# Patient Record
Sex: MALE | Race: BLACK OR AFRICAN AMERICAN
[De-identification: names, ages, dates, MRNs, and addresses within clinical notes are randomized per-mention and may not be internally consistent; named-entity substitution may affect disease eponyms.]

---

## 2020-07-08 ENCOUNTER — HOSPITAL ENCOUNTER (INPATIENT)
Dept: HOSPITAL 12 - ER | Age: 70
LOS: 7 days | Discharge: NURSING, REHAB OR LONG-TERM CARE | DRG: 885 | End: 2020-07-15
Payer: MEDICARE

## 2020-07-08 VITALS — DIASTOLIC BLOOD PRESSURE: 68 MMHG | SYSTOLIC BLOOD PRESSURE: 129 MMHG

## 2020-07-08 VITALS — BODY MASS INDEX: 30.66 KG/M2 | HEIGHT: 71 IN | WEIGHT: 219 LBS

## 2020-07-08 DIAGNOSIS — E44.1: ICD-10-CM

## 2020-07-08 DIAGNOSIS — F17.210: ICD-10-CM

## 2020-07-08 DIAGNOSIS — F20.9: Primary | ICD-10-CM

## 2020-07-08 DIAGNOSIS — F29: ICD-10-CM

## 2020-07-08 DIAGNOSIS — E88.09: ICD-10-CM

## 2020-07-08 DIAGNOSIS — Z59.0: ICD-10-CM

## 2020-07-08 LAB
ALP SERPL-CCNC: 85 U/L (ref 50–136)
ALT SERPL W/O P-5'-P-CCNC: 42 U/L (ref 16–63)
AMPHETAMINES UR QL SCN>1000 NG/ML: NEGATIVE
APAP SERPL-MCNC: < 2 UG/ML (ref 10–30)
APPEARANCE UR: CLEAR
AST SERPL-CCNC: 31 U/L (ref 15–37)
BARBITURATES UR QL SCN: NEGATIVE
BASOPHILS # BLD AUTO: 0.1 K/UL (ref 0–8)
BASOPHILS NFR BLD AUTO: 1.1 % (ref 0–2)
BILIRUB DIRECT SERPL-MCNC: 0.2 MG/DL (ref 0–0.2)
BILIRUB SERPL-MCNC: 0.5 MG/DL (ref 0.2–1)
BILIRUB UR QL STRIP: NEGATIVE
BUN SERPL-MCNC: 16 MG/DL (ref 7–18)
CHLORIDE SERPL-SCNC: 107 MMOL/L (ref 98–107)
CO2 SERPL-SCNC: 29 MMOL/L (ref 21–32)
COCAINE UR QL SCN: NEGATIVE
COLOR UR: YELLOW
CREAT SERPL-MCNC: 0.9 MG/DL (ref 0.6–1.3)
EOSINOPHIL # BLD AUTO: 0.2 K/UL (ref 0–0.7)
EOSINOPHIL NFR BLD AUTO: 2.6 % (ref 0–7)
ETHANOL SERPL-MCNC: < 3 MG/DL (ref 0–0)
GLUCOSE SERPL-MCNC: 94 MG/DL (ref 74–106)
GLUCOSE UR STRIP-MCNC: NEGATIVE MG/DL
HCT VFR BLD AUTO: 44 % (ref 36.7–47.1)
HGB BLD-MCNC: 14.3 G/DL (ref 12.5–16.3)
HGB UR QL STRIP: (no result)
KETONES UR STRIP-MCNC: NEGATIVE MG/DL
LEUKOCYTE ESTERASE UR QL STRIP: NEGATIVE
LYMPHOCYTES # BLD AUTO: 1.7 K/UL (ref 20–40)
LYMPHOCYTES NFR BLD AUTO: 27.5 % (ref 20.5–51.5)
MCH RBC QN AUTO: 32.9 UUG (ref 23.8–33.4)
MCHC RBC AUTO-ENTMCNC: 33 G/DL (ref 32.5–36.3)
MCV RBC AUTO: 101.3 FL (ref 73–96.2)
MONOCYTES # BLD AUTO: 0.4 K/UL (ref 2–10)
MONOCYTES NFR BLD AUTO: 6.6 % (ref 0–11)
NEUTROPHILS # BLD AUTO: 3.9 K/UL (ref 1.8–8.9)
NEUTROPHILS NFR BLD AUTO: 62.2 % (ref 38.5–71.5)
NITRITE UR QL STRIP: NEGATIVE
OPIATES UR QL SCN: NEGATIVE
PCP UR QL SCN>25 NG/ML: NEGATIVE
PH UR STRIP: 6.5 [PH] (ref 5–8)
PLATELET # BLD AUTO: 195 K/UL (ref 152–348)
POTASSIUM SERPL-SCNC: 4.8 MMOL/L (ref 3.5–5.1)
RBC # BLD AUTO: 4.34 MIL/UL (ref 4.06–5.63)
RBC #/AREA URNS HPF: (no result) /HPF (ref 0–3)
SP GR UR STRIP: >=1.03 (ref 1–1.03)
THC UR QL SCN>50 NG/ML: NEGATIVE
UROBILINOGEN UR STRIP-MCNC: 0.2 E.U./DL
WBC # BLD AUTO: 6.3 K/UL (ref 3.6–10.2)
WBC #/AREA URNS HPF: (no result) /HPF (ref 0–3)
WS STN SPEC: 6.6 G/DL (ref 6.4–8.2)

## 2020-07-08 PROCEDURE — A4663 DIALYSIS BLOOD PRESSURE CUFF: HCPCS

## 2020-07-08 PROCEDURE — G0480 DRUG TEST DEF 1-7 CLASSES: HCPCS

## 2020-07-08 SDOH — ECONOMIC STABILITY - HOUSING INSECURITY: HOMELESSNESS: Z59.0

## 2020-07-08 NOTE — NUR
Patient BIB pvt ambulance via [facility]. Patient on 5150 hold for DTS / DTO. 
Denies any HI / SI plans at this time. Breathing even and unlabored. no cough 
or SOB noted. Patient able to ambulate from w/c to bathroom. Safety precautions 
implemented. s/r up x2. No 1:1 sitter available. Supervisor aware. frequent 
visual checks done. NAD noted

## 2020-07-08 NOTE — NUR
1745 Admitte to MHU a 69 yr. old   , placed o 5150 for DTS and DTO. Patient 
hearing voices and suicidal ideation but no plan. Upon face to face patient denies suicidal 
ideation. Patient alert and ox4.  Charge nurse notified p-sychiatric on call and [facility] doctor 
to reconcile medication.

## 2020-07-09 VITALS — DIASTOLIC BLOOD PRESSURE: 88 MMHG | SYSTOLIC BLOOD PRESSURE: 153 MMHG

## 2020-07-09 VITALS — SYSTOLIC BLOOD PRESSURE: 135 MMHG | DIASTOLIC BLOOD PRESSURE: 79 MMHG

## 2020-07-09 LAB
ALP SERPL-CCNC: 83 U/L (ref 50–136)
ALT SERPL W/O P-5'-P-CCNC: 44 U/L (ref 16–63)
AST SERPL-CCNC: 26 U/L (ref 15–37)
BILIRUB SERPL-MCNC: 0.5 MG/DL (ref 0.2–1)
BUN SERPL-MCNC: 17 MG/DL (ref 7–18)
CHLORIDE SERPL-SCNC: 105 MMOL/L (ref 98–107)
CHOLEST SERPL-MCNC: 196 MG/DL (ref ?–200)
CO2 SERPL-SCNC: 29 MMOL/L (ref 21–32)
CREAT SERPL-MCNC: 1 MG/DL (ref 0.6–1.3)
GLUCOSE SERPL-MCNC: 85 MG/DL (ref 74–106)
HDLC SERPL-MCNC: 60 MG/DL (ref 40–60)
POTASSIUM SERPL-SCNC: 4.7 MMOL/L (ref 3.5–5.1)
TRIGL SERPL-MCNC: 132 MG/DL (ref 30–150)
WS STN SPEC: 6.5 G/DL (ref 6.4–8.2)

## 2020-07-09 RX ADMIN — CLONAZEPAM PRN MG: 0.5 TABLET ORAL at 04:21

## 2020-07-09 RX ADMIN — HYDROCODONE BITARTRATE AND ACETAMINOPHEN PRN TAB: 5; 325 TABLET ORAL at 15:07

## 2020-07-09 RX ADMIN — NICOTINE SCH MG: 21 PATCH, EXTENDED RELEASE TOPICAL at 09:45

## 2020-07-09 RX ADMIN — ARIPIPRAZOLE SCH MG: 2 TABLET ORAL at 21:28

## 2020-07-09 NOTE — NUR
Patient came out from his room asking to checked his belongings for staff is stealing some 
of his property. Assisted patient to look into his things. Patient became hyperverbal  and 
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 Patient  
continue to be hypervebal asking for patient's grievance form and it was given. Patient 
stated " I don't want to have a generic medication.Dr. Dorsey talking to patient through 
tele consultation and patient yelling to MD  and continous threatening staff to report to 
administration. Dr. Dorsey order chemical restraint: Benadryl 25 mg im; haldol 5 mg im and 
ativan 2 mg im in three different syringes- administered @ 1513. Will continue to monitor 
behavior.

## 2020-07-09 NOTE — NUR
Substance Abuse Intervention: SW conducted a substance abuse intervention with the pt 
regarding his previous cocaine use and his current cannabis use.

## 2020-07-09 NOTE — NUR
Patient is wandering in the hallway, demanding to see the doctor, asking for his BP meds, 
asking for his knee brace, hyperverbal, difficult to redirect. Given snacks and juice, given 
Klonopin.

## 2020-07-09 NOTE — NUR
Initial Discharge Plan: Pt is currently homeless and states that he would like to be 
discharged to a hospital in [place]. SW will work with the pt and the MD regarding 
appropriate discharge planning. SW will form a safe and proper discharge.

## 2020-07-10 VITALS — SYSTOLIC BLOOD PRESSURE: 124 MMHG | DIASTOLIC BLOOD PRESSURE: 64 MMHG

## 2020-07-10 VITALS — DIASTOLIC BLOOD PRESSURE: 91 MMHG | SYSTOLIC BLOOD PRESSURE: 162 MMHG

## 2020-07-10 RX ADMIN — HYDROCODONE BITARTRATE AND ACETAMINOPHEN PRN TAB: 5; 325 TABLET ORAL at 20:43

## 2020-07-10 RX ADMIN — CLONAZEPAM PRN MG: 0.5 TABLET ORAL at 08:41

## 2020-07-10 RX ADMIN — ARIPIPRAZOLE SCH MG: 2 TABLET ORAL at 20:44

## 2020-07-10 RX ADMIN — CLONIDINE HYDROCHLORIDE PRN MG: 0.1 TABLET ORAL at 08:42

## 2020-07-10 RX ADMIN — NICOTINE SCH MG: 21 PATCH, EXTENDED RELEASE TOPICAL at 09:00

## 2020-07-10 RX ADMIN — AMLODIPINE BESYLATE SCH MG: 5 TABLET ORAL at 09:00

## 2020-07-10 NOTE — NUR
Received patient in activity room, Pt. is AAO x 2-3, patient noted complaining of 
medications stating "Tell the Dr. my medications are not right, I need brand names not 
generic". Patient also very agitated with breakfast and noted being verbally abusive to 
staff and on site Dr. Patient in activity room at this time, eating breakfast.

## 2020-07-10 NOTE — NUR
RN supervisor informed Dr. Dorsey regarding patient's condition and agitation with an order 
for Ativan 1mg IM x 1 and Zyprexa 10mg IM x 1. Patient was still agitated and not compliant, 
stating we do not know what we are giving. Security called to the nurses station,  patient 
escorted to room with security and 3 nurses with patient's cooperation. Explained to patient 
regarding medication order, patient stayed on bed and ordered injection administered. 
Injection site on right buttock intact. no bleeding noted. Patient still noted screaming 
with security. When asked if patient wanted to receive BP medication patient still refused. 
Safety measures in place and will continue with care.

## 2020-07-10 NOTE — NUR
Patient sleeping comfortably in bed at this time, No SOB or any acute distress noted. NO 
complains of pain. Monitored closely, safety measures in place and will continue with care.

## 2020-07-10 NOTE — NUR
Patient in room and sleeping at this time, easily aroused, NO SOB noted. Patient was very 
agitated earlier during the shift with staff. Patient noted being verbally abusive towards 
patient and staff, very aggressive, labile mood, very demanding with care. Patient noted 
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 Patient 
reoriented and informed regarding limitations and the care being provided. Also discussed 
care with supervisor. Patient still angry and noted scolding and yelling. In room at this 
time. Monitored closely, safety measures in place, needs attended and will continue with 
care.

## 2020-07-10 NOTE — NUR
[facility] Group Note:

[facility] facilitated group therapy today, Friday 07/10/2020 at 1330. The group topic was discharge 
planning. [facility] deemed the pt inappropriate for group participation due to his ongoing 
aggressive behaviors.

## 2020-07-10 NOTE — NUR
Received patient in bed AAO x 3, compliant with his medication. No SI. No behavioral issues. 
Will continue to monitor.

## 2020-07-10 NOTE — NUR
Patient very agitated, very demanding and needy. Stays by  the nurses station complaining of 
medications and the staff. Patient very agitated and screaming at this time because he does 
not like his breakfast. Monitoring patient and safety measures in place.

## 2020-07-11 VITALS — DIASTOLIC BLOOD PRESSURE: 82 MMHG | SYSTOLIC BLOOD PRESSURE: 165 MMHG

## 2020-07-11 VITALS — DIASTOLIC BLOOD PRESSURE: 80 MMHG | SYSTOLIC BLOOD PRESSURE: 136 MMHG

## 2020-07-11 VITALS — SYSTOLIC BLOOD PRESSURE: 132 MMHG | DIASTOLIC BLOOD PRESSURE: 72 MMHG

## 2020-07-11 RX ADMIN — AMLODIPINE BESYLATE SCH MG: 5 TABLET ORAL at 08:42

## 2020-07-11 RX ADMIN — HYDROCODONE BITARTRATE AND ACETAMINOPHEN PRN TAB: 5; 325 TABLET ORAL at 11:01

## 2020-07-11 RX ADMIN — CLONAZEPAM PRN MG: 0.5 TABLET ORAL at 11:00

## 2020-07-11 RX ADMIN — NICOTINE SCH MG: 21 PATCH, EXTENDED RELEASE TOPICAL at 09:05

## 2020-07-11 RX ADMIN — CLONIDINE HYDROCHLORIDE PRN MG: 0.1 TABLET ORAL at 17:50

## 2020-07-11 RX ADMIN — NICOTINE SCH MG: 21 PATCH, EXTENDED RELEASE TOPICAL at 08:42

## 2020-07-11 RX ADMIN — AMLODIPINE BESYLATE SCH MG: 5 TABLET ORAL at 18:38

## 2020-07-11 RX ADMIN — CLONIDINE HYDROCHLORIDE PRN MG: 0.1 TABLET ORAL at 18:39

## 2020-07-11 NOTE — NUR
GPS: Nursing Notes: Destructive Behavior Toward Others:

Patient is awake and responding to his name, poor anger management, resistant with nursing 
care, believes that he is leaving today to [place], "I want all my papers right now.. I know 
my rights..", verbal abusive, threatening staff, stated, "Fuck the doctor... I am leaving to 
[place]... I know my rights..",  labile, unpredictable behavior, believes that we stole his 
belongings, "I want my radio, my TV...I want all my stuff...", patient belongings were place 
in the contraband room on admission for safety, copy of his belonging list form giving to 
him, but stated "No... No.. I want to see them..", poor impulse control, constantly coming 
to nursing station, stating that he is leaving to [place], unable to formulate a viable plan 
for self care, continue with treatment plan.

## 2020-07-11 NOTE — NUR
GPS: Threatening behavior, Hyperverbal 

Patient approach the nursing station and threatening staff that he will file a complain that 
he should not taking "GENERIC" medicine, educate patient that the generic counterpart is the 
same effect , patient refused to believe and insist that he will take branded medication, 
patient hyperverbal and verbally abusive to the staff and using profanity and threatening , 
patient also raised his voice to the writer ,patient was ask to calm down and go back to his 
room , patient wont follow redirection continously threatening staff, , called assistance 
from security to redirect patient, patient went back to dining room after spoke with 
, will continue monitor

-------------------------------------------------------------------------------

Addendum: 07/11/20 at 1827 by JOLENE STODDARD RN

-------------------------------------------------------------------------------

currently patient refusing medication

## 2020-07-12 VITALS — DIASTOLIC BLOOD PRESSURE: 70 MMHG | SYSTOLIC BLOOD PRESSURE: 101 MMHG

## 2020-07-12 VITALS — SYSTOLIC BLOOD PRESSURE: 130 MMHG | DIASTOLIC BLOOD PRESSURE: 78 MMHG

## 2020-07-12 VITALS — SYSTOLIC BLOOD PRESSURE: 110 MMHG | DIASTOLIC BLOOD PRESSURE: 77 MMHG

## 2020-07-12 RX ADMIN — AMLODIPINE BESYLATE SCH MG: 5 TABLET ORAL at 08:01

## 2020-07-12 RX ADMIN — HYDROCODONE BITARTRATE AND ACETAMINOPHEN PRN TAB: 5; 325 TABLET ORAL at 15:03

## 2020-07-12 RX ADMIN — ARIPIPRAZOLE SCH MG: 5 TABLET ORAL at 20:23

## 2020-07-12 RX ADMIN — HYDROCODONE BITARTRATE AND ACETAMINOPHEN PRN TAB: 5; 325 TABLET ORAL at 07:45

## 2020-07-12 RX ADMIN — NICOTINE SCH MG: 21 PATCH, EXTENDED RELEASE TOPICAL at 08:01

## 2020-07-12 RX ADMIN — CLONAZEPAM PRN MG: 0.5 TABLET ORAL at 20:23

## 2020-07-12 NOTE — NUR
GPS: Remain calm and cooperative most of the night. no c/o pain or discomfort at this time. 
slept 6 hrs through the night.

## 2020-07-13 VITALS — SYSTOLIC BLOOD PRESSURE: 160 MMHG | DIASTOLIC BLOOD PRESSURE: 88 MMHG

## 2020-07-13 VITALS — DIASTOLIC BLOOD PRESSURE: 83 MMHG | SYSTOLIC BLOOD PRESSURE: 143 MMHG

## 2020-07-13 VITALS — SYSTOLIC BLOOD PRESSURE: 104 MMHG | DIASTOLIC BLOOD PRESSURE: 65 MMHG

## 2020-07-13 RX ADMIN — CLONIDINE HYDROCHLORIDE PRN MG: 0.1 TABLET ORAL at 08:43

## 2020-07-13 RX ADMIN — ARIPIPRAZOLE SCH MG: 5 TABLET ORAL at 21:47

## 2020-07-13 RX ADMIN — NICOTINE SCH MG: 21 PATCH, EXTENDED RELEASE TOPICAL at 08:43

## 2020-07-13 RX ADMIN — AMLODIPINE BESYLATE SCH MG: 5 TABLET ORAL at 08:31

## 2020-07-13 NOTE — NUR
Group Note: SW encouraged the pt to attend group therapy on 7/13/20 at 1:30pm to discuss 
grief and loss but the pt was asleep in his room and began verbally aggressive with the SW 
when she attempted to arouse him. Pt stated, "What is wrong with you? Can't you see that I 
am resting right now?" SW deemed this pt inappropriate for group at this time.

## 2020-07-13 NOTE — NUR
GPS: Nursing Notes: Destructive Behavior To Others:

Patient is awake and responding to his name, poor anger management, believes that we are 
against him, threatening staff with a law audrey, "I want my belongings.. It is my right..", "I 
don't care about the [facility] doctor.. I am leaving to [place] and get my brand 
medications..", loud, pressured and speech, hyperverbal, argumentative, believes that [facility] stole some of his belongings, verbal abusive toward staff at times, gets 
easily irritable when redirected, unable to formulate a viable plan for self care, continue 
with treatment plan.

## 2020-07-14 VITALS — SYSTOLIC BLOOD PRESSURE: 132 MMHG | DIASTOLIC BLOOD PRESSURE: 65 MMHG

## 2020-07-14 VITALS — DIASTOLIC BLOOD PRESSURE: 71 MMHG | SYSTOLIC BLOOD PRESSURE: 135 MMHG

## 2020-07-14 RX ADMIN — ARIPIPRAZOLE SCH MG: 5 TABLET ORAL at 20:50

## 2020-07-14 RX ADMIN — HYDROCODONE BITARTRATE AND ACETAMINOPHEN PRN TAB: 5; 325 TABLET ORAL at 20:50

## 2020-07-14 RX ADMIN — AMLODIPINE BESYLATE SCH MG: 5 TABLET ORAL at 08:13

## 2020-07-14 RX ADMIN — NICOTINE SCH MG: 21 PATCH, EXTENDED RELEASE TOPICAL at 08:13

## 2020-07-14 NOTE — NUR
PT SLEPT 5 HOURS. PT IN NO ACUTE DISTRESS. PT PRESCRIBED MEDICATION GIVEN AND PT TOLERATED 
IT WELL.PT JUST TOOK 2 TABLET OF ABILIFY WHICH IS EQUIVALENT OF 10MG AND THE REST OF THE 
MEDICATION HE DIDN'T WANT TO TAKE IT. HE STATED HE DOESN'T WANT IT. WASTED MEDICATION THROWN 
OUT IN THE MEDICATION WASTED BIN. CHARGE NURSE AWARE OF IT.   PT GIVEN RESTORIL PRN AT 
2301H. PT DEMANDING. PT EASILY AGITATED. PT WANTS TO WATCH TELEVISION AT 0330H AM. REMIND 
THE PT THAT WE CAN'T LET HIM WATCH BECAUSE ITS STILL TIME FOR SLEEPING AND OTHER PATIENT 
WILL WAKE UP BECAUSE OF THE NOISE. PT AGITATED AND MAD, PT HYPERVERBAL.  SAFETY AND COMFORT 
PROVIDED. WILL ENDORSE TO INCOMING NURSE FOR CONTINUITY OF CARE.

## 2020-07-14 NOTE — NUR
PATIENT BEING HYPERVERBAL, VERBALLY ABUSIVE TOWARDS STAFF, PATIENT USING PROFANITY WORDS, 
UNABLE TO REDIRECT, THREATENING STAFF, PT EVEN VERBALIZE TO FILE GRIEVANCE AND ASK IF HE CAN 
DO AMA, PATIENT THEN WENT BACK TO DINING ROOM AND STAYS THERE, WILL CONTINUE MONITOR 
BEHAVIOR

## 2020-07-14 NOTE — NUR
NF Referral: [facility] faxed a referral to [facility] with attention 
to Josesito and KALEY to the fax number: [phone].

-------------------------------------------------------------------------------

Addendum: 07/14/20 at 0933 by GINO DEUTSCH

-------------------------------------------------------------------------------

SNF Referral***

## 2020-07-14 NOTE — NUR
Pt refused to take full prescribed dose of Abilify at 20mg, wanting to only take 10mg, 
stating that "it makes me sleep all day."  This nurse explained the risks/benefits of not 
taking full prescribed dose three time but patient still refused.  Pt took the 10mg by mouth 
and the remainder of the Abilify was returned to the pharmacy, with a return slip, via the 
return receptacle in the medication room.

## 2020-07-14 NOTE — NUR
GPS: Nursing Notes: Destructive Behavior To Others:

Patient is awake and responding to his name, poor anger management, verbal abusive toward 
staff, loud and angry affect, using profanity toward staff, setting limits, but stated "I am 
a medical doctor and a psychology...Who the fuck are you.... Fucken shit..", "I am going to 
audrey you...', "I am going to get you fired", "I am going to call my ..", hyperverbal, 
argumentative, threatening staff, gets easily irritable when redirected, "I want to leave 
AMA...", redirected, but stating "I don't give a fuck what the doctor wants..", unable to 
formulate a viable plan for self care, believes that he is going to [place], continue with 
treatment plan.

## 2020-07-14 NOTE — NUR
SNF Contact: CJ ([phone]), admissions from [facility], 
contacted the SW and stated the pt was accepting pending the SI/HI clearance and negative 
COVID result.

## 2020-07-14 NOTE — NUR
Patient received into care walking in hallway.  Patient is alert/oriented x2-3 and has no 
complaints of pain or discomfort at this time.  All safety, fall, allergy, and GPS/MHU 
precautions are in place.  Will continue to monitor and assess.

## 2020-07-15 VITALS — DIASTOLIC BLOOD PRESSURE: 83 MMHG | SYSTOLIC BLOOD PRESSURE: 149 MMHG

## 2020-07-15 VITALS — SYSTOLIC BLOOD PRESSURE: 149 MMHG | DIASTOLIC BLOOD PRESSURE: 83 MMHG

## 2020-07-15 RX ADMIN — AMLODIPINE BESYLATE SCH MG: 5 TABLET ORAL at 08:36

## 2020-07-15 RX ADMIN — HYDROCODONE BITARTRATE AND ACETAMINOPHEN PRN TAB: 5; 325 TABLET ORAL at 03:19

## 2020-07-15 RX ADMIN — HYDROCODONE BITARTRATE AND ACETAMINOPHEN PRN TAB: 5; 325 TABLET ORAL at 13:00

## 2020-07-15 RX ADMIN — NICOTINE SCH MG: 21 PATCH, EXTENDED RELEASE TOPICAL at 08:36

## 2020-07-15 NOTE — NUR
Patient slept 3 hours and with complaints of pain addressed with prescribed PO analgesics.  
Pt was compliant with care provided by staff but was noncompliant with prescribed 20mg 
Abilify, only taking 10mg at the prescribed time of 2100h. Pt is now awake and alert and 
watching television in the activities room.  All allergy, safety,GPS/MHU, and fall 
precaution measures remain in place.

## 2020-07-15 NOTE — NUR
Discharge Note: Pt was discharged to [facility] located at [address] [place] [id number]; ([phone]). Pt was placed in Rm 10C. Pt was 
transported via [facility] at 1500. Pt does not have any family to notify.  notified [facility], Jerry DON ([phone]), about this pts placement. Upon discharge, 
the pt appeared to be in a euthymic mood and presented with a calm affect. Pt appeared to be 
alert and oriented x4. Pt denied both suicidal and homicidal ideation as well as auditory 
and visual hallucinations. Pt appeared to be ambulatory with the assistance of a walker. Pt 
appeared to be disheveled but was appropriately dressed. Pt was given homeless resources 
that include shelters, food galvan, hot showers, health clinics, mental health clinics and 
substance abuse referrals. Pt signed the Homeless waiver as well as the Choice of Vendor 
form. Pt will continue to be under the care of his psychiatrist, Dr. Dorsey, located at 
[address] [place] [address]; [phone] and will be under the 
care of internist, Dr. Henson, located at [address], [place] 
[address]; [phone].

## 2020-07-15 NOTE — NUR
Mental Health Contact: [facility] received a call from Jerry DON ([phone]), from [facility], who stated that they have been having a difficult time placing the pt. 
SW stated that the pt agreed to the placement that was secured for him. SW informed him that 
the pt is going to be discharged to [facility].

## 2020-07-15 NOTE — NUR
1400 Called in report to facility, [facility], spoke with SELIN Briggs who 
will admit the patient. Nurse informed about medications to continue upon discharged- nurse 
verbalized understanding. 1515 Patient discharged  to SNF  via ambulance  stable condition. 
Denies SI/HI. No delusion . No a/v hallucination noted.

## 2020-07-15 NOTE — NUR
SNF Contact: SW faxed the SI/HI clearance and the negative COVID result to [facility] with attn to CJ to the fax number: [phone].

## 2020-07-21 NOTE — NUR
Social Work Firearms Report ([employer]):

 completed and submitted a [employer] firearms report for 5150 DTSO certification. A 
copy of report has been placed in patient chart.